# Patient Record
Sex: FEMALE | Employment: STUDENT | URBAN - METROPOLITAN AREA
[De-identification: names, ages, dates, MRNs, and addresses within clinical notes are randomized per-mention and may not be internally consistent; named-entity substitution may affect disease eponyms.]

---

## 2022-02-13 ENCOUNTER — NURSE TRIAGE (OUTPATIENT)
Dept: NURSING | Facility: CLINIC | Age: 27
End: 2022-02-13

## 2022-02-13 NOTE — TELEPHONE ENCOUNTER
Ice skating today, fell and hit back of head at 1:30 pm    Sensitive to screens  Sore neck  -rates mild  -started 3-4 hours ago  -feels like muscle soreness  -able to move neck like normal   Slight nausea, very mild  Slight bump  -yessica-quarter size   -back left side of head  Mild headache     Pt is just wondering if she can sleep, had heard that you are not supposed to after a head injury.     No vomiting  No fever  No loss of consciousness   No numbness/weakness  No tenderness  No lacerations  No seizure  No vision changes   No fluid dripping from nose/ears  No confusion  No changes in speech   No changes in gait     Denies pregnancy    Triaged to a disposition of home care. Home care advice given. Patient is agreeable.     COVID 19 Nurse Triage Plan/Patient Instructions    Please be aware that novel coronavirus (COVID-19) may be circulating in the community. If you develop symptoms such as fever, cough, or SOB or if you have concerns about the presence of another infection including coronavirus (COVID-19), please contact your health care provider or visit [email or web address].     Disposition/Instructions    Home care recommended. Follow home care protocol based instructions.    Thank you for taking steps to prevent the spread of this virus.  o Limit your contact with others.  o Wear a simple mask to cover your cough.  o Wash your hands well and often.    Resources    [facility]: About COVID-19: [email or web address]    CDC: What to Do If You're Sick: www.cdc.gov/coronavirus/2019-ncov/about/steps-when-sick.html    CDC: Ending Home Isolation: www.cdc.gov/coronavirus/2019-ncov/hcp/disposition-in-home-patients.html     CDC: Caring for Someone: www.cdc.gov/coronavirus/2019-ncov/if-you-are-sick/care-for-someone.html     [facility]: Interim Guidance for Hospital Discharge to Home: www.health.[facility].[place].us/diseases/coronavirus/hcp/hospdischarge.pdf    [facility] clinical trials (COVID-19  "research studies): clinicalaffairs.[employer].[facility]/[employer]-clinical-trials     Below are the [place] hotlines at the [place] Department of [facility] ([facility]). Interpreters are available.   o For health questions: Call 463-685-3849 or 1-787.698.5352 (7 a.m. to 7 p.m.)  o For questions about schools and childcare: Call 495-520-0339 or 1-178.549.6955 (7 a.m. to 7 p.m.)         Reason for Disposition    Scalp swelling, bruise or pain    Additional Information    Negative: [1] ACUTE NEURO SYMPTOM AND [2] present now  (DEFINITION: difficult to awaken OR confused thinking and talking OR slurred speech OR weakness of arms OR unsteady walking)    Negative: Knocked out (unconscious) > 1 minute    Negative: Seizure (convulsion) occurred  (Exception: prior history of seizures and now alert and without Acute Neuro Symptoms)    Negative: Penetrating head injury (e.g., knife, gun shot wound, metal object)    Negative: [1] Major bleeding (e.g., actively dripping or spurting) AND [2] can't be stopped    Negative: [1] Dangerous mechanism of injury (e.g., MVA, diving, trampoline, contact sports, fall > 10 feet or 3 meters) AND [2] NECK pain AND [3] began < 1 hour after injury    Negative: Sounds like a life-threatening emergency to the triager    Negative: [1] Diagnosed with concussion AND [2] within last 14 days    Negative: [1] Traumatic brain injury (mTBI; concussion) AND [2] more than 14 days since head injury    Negative: Can't remember what happened (amnesia)    Negative: Vomiting once or more    Negative: [1] Loss of vision or double vision AND [2] present now    Negative: Watery or blood-tinged fluid dripping from the NOSE or EARS now  (Exception: tears from crying or nosebleed from nasal trauma)    Negative: [1] One or two \"black eyes\" (bruising, purple color of eyelids) AND [2] onset within 24 hours of head injury    Negative: Large swelling or bruise > 2 inches (5 cm)    Negative: Skin is split open or gaping  (or length > 1/2 inch or 12 " mm)    Negative: [1] Bleeding AND [2] won't stop after 10 minutes of direct pressure (using correct technique)    Negative: Sounds like a serious injury to the triager    Negative: [1] ACUTE NEURO SYMPTOM AND [2] now fine  (DEFINITION: difficult to awaken OR confused thinking and talking OR slurred speech OR weakness of arms OR unsteady walking)    Negative: [1] Knocked out (unconscious) < 1 minute AND [2] now fine    Negative: [1] SEVERE headache AND [2] not improved 2 hours after pain medicine/ice packs    Negative: Dangerous injury (e.g., MVA, diving, trampoline, contact sports, fall > 10 feet or 3 meters) or severe blow from hard object (e.g., golf club or baseball bat)    Negative: Taking Coumadin (warfarin) or other strong blood thinner, or known bleeding disorder (e.g., thrombocytopenia)    Negative: Suspicious history for the injury    Negative: [1] Age over 65 years AND [2] swelling or bruise    Negative: Patient is confused or is an unreliable provider of information (e.g., dementia, severe intellectual disability, alcohol intoxication)    Negative: [1] No prior tetanus shots (or is not fully vaccinated) AND [2] any wound (e.g., cut, scrape)    Negative: [1] HIV positive or severe immunodeficiency (severely weak immune system) AND [2] DIRTY cut or scrape    Negative: [1] Last tetanus shot > 5 years ago AND [2] DIRTY cut or scrape    Negative: [1] Last tetanus shot > 10 years ago AND [2] CLEAN cut or scrape (e.g., object AND skin were clean)    Negative: [1] After 72 hours AND [2] headache persists    Protocols used: HEAD INJURY-A-    Marycruz Her RN on 2/13/2022 at 2:12 AM

## 2024-08-29 ENCOUNTER — MEDICAL CORRESPONDENCE (OUTPATIENT)
Dept: HEALTH INFORMATION MANAGEMENT | Facility: CLINIC | Age: 29
End: 2024-08-29
Payer: COMMERCIAL

## 2024-09-05 ENCOUNTER — OFFICE VISIT (OUTPATIENT)
Dept: AUDIOLOGY | Facility: CLINIC | Age: 29
End: 2024-09-05
Payer: COMMERCIAL

## 2024-09-05 DIAGNOSIS — H92.02 OTALGIA OF LEFT EAR: Primary | ICD-10-CM

## 2024-09-05 PROCEDURE — 92550 TYMPANOMETRY & REFLEX THRESH: CPT | Performed by: AUDIOLOGIST

## 2024-09-05 PROCEDURE — 92557 COMPREHENSIVE HEARING TEST: CPT | Performed by: AUDIOLOGIST

## 2024-09-05 NOTE — PROGRESS NOTES
"Chief Complaint   Patient presents with    Consult     Otalgia     History of Present Illness  Suzanne Marit is a 29 year old female who presents to me today for ear evaluation. The patient reports a left ear tympanic membrane rupture in 2011 while diving. She had a tympanoplasty in 2015 as the rupture would not close on its own. She reports continued issues with the left ear while swimming, sensations of pain and water dripping into her inner ear. The patient denies  bilateral tinnitus, otalgia in the right ear, bilateral drainage, bilateral aural fullness, family history of hearing loss, history of noise exposure, and dizziness.     The patient reports hearing loss or a plugged feeling in left ear.  It has been present and noticeable for approximately 4875-1092. She started wearing ear plugs while swimming.  Sometimes the water leaks around the ear plug and the ear canal becomes irritated and itchy.  She feels these symptoms present 2-3 times per week. There is no history of recent head trauma. The patient denies vertigo, otorrhea. She does note otalgia in the left ear after swimming. The patient has tried none, but these things have not helped.     Past Medical History  There is no problem list on file for this patient.    Current Medications   No current outpatient medications on file.    Allergies  Not on File    Social History   Social History     Socioeconomic History    Marital status: Single       Family History  No family history on file.    Review of Systems  As per HPI and PMHx, otherwise 10+ comprehensive system review is negative.    Physical Exam  /73   Pulse 79   Temp 97.4  F (36.3  C) (Tympanic)   Ht 1.651 m (5' 5\")   Wt 67.1 kg (148 lb)   SpO2 99%   BMI 24.63 kg/m    GENERAL: Patient is a pleasant, cooperative 29 year old female in no acute distress.  HEAD: Normocephalic, atraumatic.  Hair and scalp are normal.  EYES: Pupils are equal, round, reactive to light and accommodation.  " Extraocular movements are intact.  The sclera nonicteric without injection.  The extraocular structures are normal.  EARS: Normal shape and symmetry.  No tenderness when palpating the mastoid or tragal areas bilaterally.  Otoscopic exam reveals a scanty amount of cerumen bilaterally.  The bilateral tympanic membranes are round, intact without evidence of effusion, good landmarks.  No retraction, granulation, or drainage. Left appears to have a monomer from past tympanoplasty.   NEUROLOGIC: Cranial nerves II through XII are grossly intact.  Voice is strong.  Patient is House-Brackmann I/VI bilaterally.  CARDIOVASCULAR: Extremities are warm and well-perfused.  No significant peripheral edema.  RESPIRATORY: Patient has nonlabored breathing without cough, wheeze, stridor.  PSYCHIATRIC: Patient is alert and oriented.  Mood and affect appear normal.  SKIN: Warm and dry.  No scalp, face, or neck lesions noted.    Audiogram  The patient underwent an audiogram performed today.  My review of the audiogram shows normal.  Pure-tone average is 226 dB on the right and 226 dB on the left.  Speech reception threshold is 10 dB on the right and 15 dB on the left.  The patient had 100% word recognition on the right and 100% word recognition on the left.  The patient had a a tympanogram on the right and a a tympanogram on the left.     Assessment and Plan     ICD-10-CM    1. Otalgia, left  H92.02 ofloxacin (OCUFLOX) 0.3 % ophthalmic solution     clotrimazole (LOTRIMIN) 1 % external solution        It was my pleasure seeing Suzanne Marti today in clinic.  The patient presents to me today with recurrent otalgia in the left ear. Currently she does not have any infection. However, I do feel when her left ear plug leaks she develops otitis externa/otomycosis of the left ear due to moisture. We discussed using a blow dry to dry the ear canals after she is swimming. I prescribed some drops for future use if she develops any pain, itching  or drainage.     LIZ Cruz [facility]  Otolaryngology  [place] [facility] [place]

## 2024-09-05 NOTE — PROGRESS NOTES
AUDIOLOGY REPORT    SUBJECTIVE:  Suzanne Marti is a 29 year old female who was seen in the [facility]iology [facility] on 9/05/24 for audiologic evaluation, referred by self.  The patient reports a left ear tympanic membrane rupture in 2011 while diving. She had a tympanoplasty in 2015 as the rupture would not close on its own. She reports continued issues with the left ear while swimming, sensations of pain and water dripping into her inner ear. The patient denies  bilateral tinnitus, otalgia in the right ear, bilateral drainage, bilateral aural fullness, family history of hearing loss, history of noise exposure, and dizziness. Patient was unaccompanied to today's visit.     Abuse Screening:  Do you feel unsafe at home or work/school? No  Do you feel threatened by someone? No  Does anyone try to keep you from having contact with others, or doing things outside of your home? No  Physical signs of abuse present? No     Fall Risk Screen:  1. Have you fallen two or more times in the past year? No  2. Have you fallen and had an injury in the past year? No    OBJECTIVE:    Otoscopic exam indicates minimal cerumen bilaterally     Pure Tone Thresholds assessed using standard techniques  audiometry with good  reliability from 250-8000 Hz bilaterally using insert earphones and circumaural headphones     RIGHT:  normal hearing sensitivity for all frequencies tested.     LEFT:    normal hearing sensitivity for all frequencies tested.    NOTE: Change in transducers did not merit a change in thresholds. There is an air-bone gap in the left ear at 4000 Hz only    Tympanogram:    RIGHT: normal eardrum mobility    LEFT:   normal eardrum mobility    Reflexes (reported by stimulus ear): 1000 Hz  RIGHT: Ipsilateral is present at normal levels  RIGHT: Contralateral is absent at frequencies tested  LEFT:   Ipsilateral is present at normal levels  LEFT:   Contralateral is elevated    Speech Reception  Threshold:    RIGHT: 10 dB HL    LEFT:   15 dB HL    Word Recognition Score:     RIGHT: 100% at 50 dB HL using NU-6 recorded word list.    LEFT:   100% at 50 dB HL using NU-6 recorded word list.    ASSESSMENT:   Left ear otalgia     Today s results were discussed with the patient in detail.     PLAN:  Patient was counseled regarding hearing loss and impact on communication. It is recommended that the patient keep and attend her ENT appointment on 9/9/2024.  Please call this clinic with questions regarding these results or recommendations.    Shaquille Carson CCC-A  Licensed Audiologist #[id number]  9/5/2024    CC: Eleazar HILL CNP

## 2024-09-09 ENCOUNTER — OFFICE VISIT (OUTPATIENT)
Dept: OTOLARYNGOLOGY | Facility: CLINIC | Age: 29
End: 2024-09-09
Payer: COMMERCIAL

## 2024-09-09 VITALS
WEIGHT: 148 LBS | HEIGHT: 65 IN | DIASTOLIC BLOOD PRESSURE: 73 MMHG | SYSTOLIC BLOOD PRESSURE: 110 MMHG | TEMPERATURE: 97.4 F | BODY MASS INDEX: 24.66 KG/M2 | OXYGEN SATURATION: 99 % | HEART RATE: 79 BPM

## 2024-09-09 DIAGNOSIS — H92.02 OTALGIA, LEFT: Primary | ICD-10-CM

## 2024-09-09 PROCEDURE — 99203 OFFICE O/P NEW LOW 30 MIN: CPT

## 2024-09-09 RX ORDER — BUPROPION HYDROCHLORIDE 300 MG/1
TABLET ORAL
COMMUNITY
Start: 2024-08-21

## 2024-09-09 RX ORDER — ALBUTEROL SULFATE 90 UG/1
AEROSOL, METERED RESPIRATORY (INHALATION)
COMMUNITY
Start: 2024-08-21

## 2024-09-09 RX ORDER — OFLOXACIN 3 MG/ML
SOLUTION/ DROPS OPHTHALMIC
Qty: 10 ML | Refills: 0 | Status: SHIPPED | OUTPATIENT
Start: 2024-09-09

## 2024-09-09 RX ORDER — CLOTRIMAZOLE 1 G/ML
SOLUTION TOPICAL 2 TIMES DAILY
Qty: 15 ML | Refills: 0 | Status: SHIPPED | OUTPATIENT
Start: 2024-09-09

## 2024-09-09 ASSESSMENT — PAIN SCALES - GENERAL: PAINLEVEL: NO PAIN (0)

## 2024-09-09 NOTE — LETTER
"9/9/2024      Suzanne Marti  [address] [address]      Dear Colleague,    Thank you for referring your patient, Suzanne Marti, to the [facility]. Please see a copy of my visit note below.    Chief Complaint   Patient presents with     Consult     Otalgia     History of Present Illness  Suzanne Marti is a 29 year old female who presents to me today for ear evaluation. The patient reports a left ear tympanic membrane rupture in 2011 while diving. She had a tympanoplasty in 2015 as the rupture would not close on its own. She reports continued issues with the left ear while swimming, sensations of pain and water dripping into her inner ear. The patient denies  bilateral tinnitus, otalgia in the right ear, bilateral drainage, bilateral aural fullness, family history of hearing loss, history of noise exposure, and dizziness.     The patient reports hearing loss or a plugged feeling in left ear.  It has been present and noticeable for approximately 9947-7309. She started wearing ear plugs while swimming.  Sometimes the water leaks around the ear plug and the ear canal becomes irritated and itchy.  She feels these symptoms present 2-3 times per week. There is no history of recent head trauma. The patient denies vertigo, otorrhea. She does note otalgia in the left ear after swimming. The patient has tried none, but these things have not helped.     Past Medical History  There is no problem list on file for this patient.    Current Medications   No current outpatient medications on file.    Allergies  Not on File    Social History   Social History     Socioeconomic History     Marital status: Single       Family History  No family history on file.    Review of Systems  As per HPI and PMHx, otherwise 10+ comprehensive system review is negative.    Physical Exam  /73   Pulse 79   Temp 97.4  F (36.3  C) (Tympanic)   Ht 1.651 m (5' 5\")   Wt 67.1 kg (148 lb)   SpO2 " 99%   BMI 24.63 kg/m    GENERAL: Patient is a pleasant, cooperative 29 year old female in no acute distress.  HEAD: Normocephalic, atraumatic.  Hair and scalp are normal.  EYES: Pupils are equal, round, reactive to light and accommodation.  Extraocular movements are intact.  The sclera nonicteric without injection.  The extraocular structures are normal.  EARS: Normal shape and symmetry.  No tenderness when palpating the mastoid or tragal areas bilaterally.  Otoscopic exam reveals a scanty amount of cerumen bilaterally.  The bilateral tympanic membranes are round, intact without evidence of effusion, good landmarks.  No retraction, granulation, or drainage. Left appears to have a monomer from past tympanoplasty.   NEUROLOGIC: Cranial nerves II through XII are grossly intact.  Voice is strong.  Patient is House-Brackmann I/VI bilaterally.  CARDIOVASCULAR: Extremities are warm and well-perfused.  No significant peripheral edema.  RESPIRATORY: Patient has nonlabored breathing without cough, wheeze, stridor.  PSYCHIATRIC: Patient is alert and oriented.  Mood and affect appear normal.  SKIN: Warm and dry.  No scalp, face, or neck lesions noted.    Audiogram  The patient underwent an audiogram performed today.  My review of the audiogram shows normal.  Pure-tone average is 226 dB on the right and 226 dB on the left.  Speech reception threshold is 10 dB on the right and 15 dB on the left.  The patient had 100% word recognition on the right and 100% word recognition on the left.  The patient had a a tympanogram on the right and a a tympanogram on the left.     Assessment and Plan     ICD-10-CM    1. Otalgia, left  H92.02 ofloxacin (OCUFLOX) 0.3 % ophthalmic solution     clotrimazole (LOTRIMIN) 1 % external solution        It was my pleasure seeing Suzanne Marti today in clinic.  The patient presents to me today with recurrent otalgia in the left ear. Currently she does not have any infection. However, I do feel when  her left ear plug leaks she develops otitis externa/otomycosis of the left ear due to moisture. We discussed using a blow dry to dry the ear canals after she is swimming. I prescribed some drops for future use if she develops any pain, itching or drainage.     LIZ Cruz CNP  Otolaryngology  [facility]      Again, thank you for allowing me to participate in the care of your patient.        Sincerely,        LIZ Cruz CNP

## 2024-09-09 NOTE — PATIENT INSTRUCTIONS
You were seen by Eleazar Andrew CNP.  If you have questions or concerns regarding your appointment today, you can reach out to our call center at [phone].  The following has been recommended at your appointment today:      Use the drops as needed for ear pain/itching after swimming.   Let me know if this does not help.

## 2024-11-14 ENCOUNTER — TELEPHONE (OUTPATIENT)
Dept: NURSING | Facility: CLINIC | Age: 29
End: 2024-11-14
Payer: COMMERCIAL

## 2024-11-14 NOTE — TELEPHONE ENCOUNTER
"Patient calling with concern surrounding her IUD. She says she has had the IUD in place for about 2 years. During this time, she typically will get what she calls \"phantom periods\" with cramping and spotting. Today, the cramps have been worse than usual, more on the left than the right. She says it \"feels different\" and it is easier to feel the string than it normally is. Patient is not typically seen in the [facility] system. Gave her the nurse line phone number for her PCP clinic. Discussed reasons to be seen in an urgent care such as worsening abdominal pain, nausea and vomiting, or fever.   "